# Patient Record
Sex: FEMALE | Race: OTHER | Employment: PART TIME | URBAN - METROPOLITAN AREA
[De-identification: names, ages, dates, MRNs, and addresses within clinical notes are randomized per-mention and may not be internally consistent; named-entity substitution may affect disease eponyms.]

---

## 2021-07-05 ENCOUNTER — APPOINTMENT (OUTPATIENT)
Dept: GENERAL RADIOLOGY | Age: 36
End: 2021-07-05
Payer: COMMERCIAL

## 2021-07-05 ENCOUNTER — HOSPITAL ENCOUNTER (EMERGENCY)
Age: 36
Discharge: HOME | End: 2021-07-05
Attending: EMERGENCY MEDICINE
Payer: COMMERCIAL

## 2021-07-05 VITALS
HEIGHT: 64 IN | DIASTOLIC BLOOD PRESSURE: 70 MMHG | SYSTOLIC BLOOD PRESSURE: 123 MMHG | TEMPERATURE: 98.6 F | WEIGHT: 293 LBS | RESPIRATION RATE: 21 BRPM | BODY MASS INDEX: 50.02 KG/M2 | HEART RATE: 76 BPM | OXYGEN SATURATION: 98 %

## 2021-07-05 DIAGNOSIS — I16.0 HYPERTENSIVE URGENCY: Primary | ICD-10-CM

## 2021-07-05 DIAGNOSIS — R60.9 PERIPHERAL EDEMA: ICD-10-CM

## 2021-07-05 LAB
ALBUMIN SERPL-MCNC: 4.1 G/DL (ref 3.5–4.6)
ALP BLD-CCNC: 91 U/L (ref 40–130)
ALT SERPL-CCNC: 26 U/L (ref 0–33)
ANION GAP SERPL CALCULATED.3IONS-SCNC: 9 MEQ/L (ref 9–15)
AST SERPL-CCNC: 28 U/L (ref 0–35)
BASOPHILS ABSOLUTE: 0 K/UL (ref 0–0.2)
BASOPHILS RELATIVE PERCENT: 0.4 %
BILIRUB SERPL-MCNC: 0.3 MG/DL (ref 0.2–0.7)
BUN BLDV-MCNC: 13 MG/DL (ref 6–20)
CALCIUM SERPL-MCNC: 9.2 MG/DL (ref 8.5–9.9)
CHLORIDE BLD-SCNC: 108 MEQ/L (ref 95–107)
CO2: 22 MEQ/L (ref 20–31)
CREAT SERPL-MCNC: 0.89 MG/DL (ref 0.5–0.9)
EOSINOPHILS ABSOLUTE: 0.1 K/UL (ref 0–0.7)
EOSINOPHILS RELATIVE PERCENT: 2.3 %
GFR AFRICAN AMERICAN: >60
GFR NON-AFRICAN AMERICAN: >60
GLOBULIN: 3.3 G/DL (ref 2.3–3.5)
GLUCOSE BLD-MCNC: 101 MG/DL (ref 70–99)
HCT VFR BLD CALC: 40.5 % (ref 37–47)
HEMOGLOBIN: 14 G/DL (ref 12–16)
INR BLD: 1
LYMPHOCYTES ABSOLUTE: 1.1 K/UL (ref 1–4.8)
LYMPHOCYTES RELATIVE PERCENT: 17.8 %
MCH RBC QN AUTO: 29.4 PG (ref 27–31.3)
MCHC RBC AUTO-ENTMCNC: 34.7 % (ref 33–37)
MCV RBC AUTO: 84.8 FL (ref 82–100)
MONOCYTES ABSOLUTE: 0.4 K/UL (ref 0.2–0.8)
MONOCYTES RELATIVE PERCENT: 6.4 %
NEUTROPHILS ABSOLUTE: 4.6 K/UL (ref 1.4–6.5)
NEUTROPHILS RELATIVE PERCENT: 73.1 %
PDW BLD-RTO: 13.6 % (ref 11.5–14.5)
PLATELET # BLD: 275 K/UL (ref 130–400)
POTASSIUM SERPL-SCNC: 4.1 MEQ/L (ref 3.4–4.9)
PRO-BNP: 88 PG/ML
PROTHROMBIN TIME: 13.5 SEC (ref 12.3–14.9)
RBC # BLD: 4.77 M/UL (ref 4.2–5.4)
SODIUM BLD-SCNC: 139 MEQ/L (ref 135–144)
TOTAL PROTEIN: 7.4 G/DL (ref 6.3–8)
TROPONIN: <0.01 NG/ML (ref 0–0.01)
WBC # BLD: 6.4 K/UL (ref 4.8–10.8)

## 2021-07-05 PROCEDURE — 71045 X-RAY EXAM CHEST 1 VIEW: CPT

## 2021-07-05 PROCEDURE — 84484 ASSAY OF TROPONIN QUANT: CPT

## 2021-07-05 PROCEDURE — 93005 ELECTROCARDIOGRAM TRACING: CPT | Performed by: EMERGENCY MEDICINE

## 2021-07-05 PROCEDURE — 36415 COLL VENOUS BLD VENIPUNCTURE: CPT

## 2021-07-05 PROCEDURE — 80053 COMPREHEN METABOLIC PANEL: CPT

## 2021-07-05 PROCEDURE — 85610 PROTHROMBIN TIME: CPT

## 2021-07-05 PROCEDURE — 6360000002 HC RX W HCPCS: Performed by: EMERGENCY MEDICINE

## 2021-07-05 PROCEDURE — 6370000000 HC RX 637 (ALT 250 FOR IP): Performed by: EMERGENCY MEDICINE

## 2021-07-05 PROCEDURE — 83880 ASSAY OF NATRIURETIC PEPTIDE: CPT

## 2021-07-05 PROCEDURE — 85025 COMPLETE CBC W/AUTO DIFF WBC: CPT

## 2021-07-05 PROCEDURE — 99285 EMERGENCY DEPT VISIT HI MDM: CPT

## 2021-07-05 PROCEDURE — 96374 THER/PROPH/DIAG INJ IV PUSH: CPT

## 2021-07-05 RX ORDER — CARVEDILOL 25 MG/1
25 TABLET ORAL DAILY
Qty: 30 TABLET | Refills: 0 | Status: SHIPPED | OUTPATIENT
Start: 2021-07-05

## 2021-07-05 RX ORDER — CARVEDILOL 25 MG/1
25 TABLET ORAL ONCE
Status: COMPLETED | OUTPATIENT
Start: 2021-07-05 | End: 2021-07-05

## 2021-07-05 RX ORDER — HYDROCHLOROTHIAZIDE 25 MG/1
25 TABLET ORAL DAILY
Qty: 30 TABLET | Refills: 0 | Status: SHIPPED | OUTPATIENT
Start: 2021-07-05

## 2021-07-05 RX ORDER — FUROSEMIDE 10 MG/ML
40 INJECTION INTRAMUSCULAR; INTRAVENOUS ONCE
Status: COMPLETED | OUTPATIENT
Start: 2021-07-05 | End: 2021-07-05

## 2021-07-05 RX ORDER — CEPHALEXIN 500 MG/1
500 CAPSULE ORAL 4 TIMES DAILY
Qty: 28 CAPSULE | Refills: 0 | Status: SHIPPED | OUTPATIENT
Start: 2021-07-05 | End: 2021-07-05 | Stop reason: CLARIF

## 2021-07-05 RX ADMIN — CARVEDILOL 25 MG: 25 TABLET, FILM COATED ORAL at 11:55

## 2021-07-05 RX ADMIN — FUROSEMIDE 40 MG: 10 INJECTION, SOLUTION INTRAMUSCULAR; INTRAVENOUS at 11:55

## 2021-07-05 ASSESSMENT — ENCOUNTER SYMPTOMS
ABDOMINAL PAIN: 0
NAUSEA: 0
SORE THROAT: 0
SHORTNESS OF BREATH: 0
CHEST TIGHTNESS: 0
VOMITING: 0
EYE PAIN: 0

## 2021-07-05 NOTE — ED NOTES
D/c instructions given. Aware pt has 2 prescriptions that were electronically sent to [facility] on [address]. Aware to start both meds tomorrow at noon. Verbalized understanding. No further complaints. Ambulated out with steady gait in no acute distress.      June Hedrick RN  07/05/21 1310

## 2021-07-05 NOTE — ED PROVIDER NOTES
[address] [facility] ED  EMERGENCY DEPARTMENT ENCOUNTER      Pt Name: Renetta Caba  MRN: [id number]  [place] 1985  Date of evaluation: 7/5/2021  Provider: Krupa Guzman, [facility]       Chief Complaint   Patient presents with    Foot Swelling     bilateral feel swelling x1week         HISTORY OF PRESENT ILLNESS   (Location/Symptom, Timing/Onset, Context/Setting, Quality, Duration, Modifying Factors, Severity)  Note limiting factors. Renetta Caba is a 39 y.o. female who presents to the emergency department . Patient comes in with bilateral lower leg swelling for 8 days. No chest pain or shortness of breath. Patient is supposed to be on blood pressure medication and was prescribed it 2 years ago. She did not have insurance at the time and never got it refilled. Now she has insurance and actually has an appointment with a primary care provider in 2 weeks. No recent travel. One leg is not more swollen than the other. HPI    Nursing Notes were reviewed. REVIEW OF SYSTEMS    (2-9 systems for level 4, 10 or more for level 5)     Review of Systems   Constitutional: Negative for activity change, appetite change, fatigue and fever. HENT: Negative for congestion and sore throat. Eyes: Negative for pain and visual disturbance. Respiratory: Negative for chest tightness and shortness of breath. Cardiovascular: Positive for leg swelling. Negative for chest pain and palpitations. Gastrointestinal: Negative for abdominal pain, nausea and vomiting. Endocrine: Negative for polydipsia. Genitourinary: Negative for flank pain and urgency. Musculoskeletal: Negative for gait problem and neck stiffness. Skin: Negative for rash. Neurological: Negative for weakness, light-headedness and headaches. Psychiatric/Behavioral: Negative for confusion and sleep disturbance. Except as noted above the remainder of the review of systems was reviewed and negative. PAST MEDICAL HISTORY     Past Medical History:   Diagnosis Date    Heart murmur          SURGICAL HISTORY       Past Surgical History:   Procedure Laterality Date     SECTION       SECTION  06/23/15    DR Grey Primer MOUTH SURGERY           CURRENT MEDICATIONS       Previous Medications    IBUPROFEN (ADVIL;MOTRIN) 600 MG TABLET    Take 1 tablet by mouth daily    LEVONORGESTREL (MIRENA) 20 MCG/24HR IUD    1 each by Intrauterine route once    MISOPROSTOL (CYTOTEC) 200 MCG TABLET    One tablet the night before planned procedure and one tablet the morning of planned procedure    OXYCODONE-ACETAMINOPHEN (PERCOCET) 5-325 MG PER TABLET    Take 1 tablet by mouth    PRENATAL VIT-DSS-FE FUM-FA (PRENATAL 19) TABS    Take  by mouth. ALLERGIES     Patient has no known allergies. FAMILY HISTORY       Family History   Problem Relation Age of Onset    Diabetes Mother     High Blood Pressure Mother           SOCIAL HISTORY       Social History     Socioeconomic History    Marital status:      Spouse name: None    Number of children: None    Years of education: None    Highest education level: None   Occupational History    None   Tobacco Use    Smoking status: Former Smoker    Smokeless tobacco: Never Used   Substance and Sexual Activity    Alcohol use: No    Drug use: No    Sexual activity: Yes     Partners: Male   Other Topics Concern    None   Social History Narrative    None     Social Determinants of Health     Financial Resource Strain:     Difficulty of Paying Living Expenses:    Food Insecurity:     Worried About Running Out of Food in the Last Year:     Ran Out of Food in the Last Year:    Transportation Needs:     Lack of Transportation (Medical):      Lack of Transportation (Non-Medical):    Physical Activity:     Days of Exercise per Week:     Minutes of Exercise per Session:    Stress:     Feeling of Stress :    Social Connections:     Frequency of Communication with Friends and Family:     Frequency of Social Gatherings with Friends and Family:     Attends Denominational Services:     Active Member of Clubs or Organizations:     Attends Club or Organization Meetings:     Marital Status:    Intimate Partner Violence:     Fear of Current or Ex-Partner:     Emotionally Abused:     Physically Abused:     Sexually Abused:        SCREENINGS                        PHYSICAL EXAM    (up to 7 for level 4, 8 or more for level 5)     ED Triage Vitals [07/05/21 1129]   BP Temp Temp Source Pulse Resp SpO2 Height Weight   (!) 190/100 98.6 °F (37 °C) Temporal 89 18 97 % 5' 4\" (1.626 m) (!) 304 lb (137.9 kg)       Physical Exam  Vitals and nursing note reviewed. Constitutional:       General: She is not in acute distress. Appearance: She is well-developed. She is obese. She is not diaphoretic. HENT:      Head: Normocephalic and atraumatic. Right Ear: External ear normal.      Left Ear: External ear normal.      Mouth/Throat:      Pharynx: No oropharyngeal exudate. Eyes:      Conjunctiva/sclera: Conjunctivae normal.      Pupils: Pupils are equal, round, and reactive to light. Neck:      Thyroid: No thyromegaly. Vascular: No JVD. Trachea: No tracheal deviation. Cardiovascular:      Rate and Rhythm: Normal rate. Heart sounds: Normal heart sounds. No murmur heard. Pulmonary:      Effort: Pulmonary effort is normal. No respiratory distress. Breath sounds: Normal breath sounds. No wheezing. Abdominal:      General: Bowel sounds are normal.      Palpations: Abdomen is soft. Tenderness: There is no abdominal tenderness. There is no guarding. Musculoskeletal:         General: Normal range of motion. Cervical back: Normal range of motion and neck supple. Right lower leg: Edema present. Left lower leg: Edema present. Comments: No homans   Skin:     General: Skin is warm and dry. Findings: No rash. Neurological:      Mental Status: She is alert and oriented to person, place, and time. Cranial Nerves: No cranial nerve deficit. Psychiatric:         Behavior: Behavior normal.         DIAGNOSTIC RESULTS     EKG: All EKG's are interpreted by the Emergency Department Physician who either signs or Co-signs this chart in the absence of a cardiologist.    Normal sinus rhythm 79 bpm Q waves inferiorly    RADIOLOGY:   Non-plain film images such as CT, Ultrasound and MRI are read by the radiologist. Plain radiographic images are visualized and preliminarily interpreted by the emergency physician with the below findings:    Chest x-ray shows no acute pulmonary disease    Interpretation per the Radiologist below, if available at the time of this note:    XR CHEST PORTABLE    (Results Pending)         ED BEDSIDE ULTRASOUND:   Performed by ED Physician - none    LABS:  Labs Reviewed   CBC WITH AUTO DIFFERENTIAL   COMPREHENSIVE METABOLIC PANEL   TROPONIN   BRAIN NATRIURETIC PEPTIDE   PROTIME-INR       All other labs were within normal range or not returned as of this dictation. EMERGENCY DEPARTMENT COURSE and DIFFERENTIAL DIAGNOSIS/MDM:   Vitals:    Vitals:    07/05/21 1129 07/05/21 1145 07/05/21 1155   BP: (!) 190/100 (!) 152/81 (!) 152/81   Pulse: 89 91 85   Resp: 18 15    Temp: 98.6 °F (37 °C)     TempSrc: Temporal     SpO2: 97% 99%    Weight: (!) 304 lb (137.9 kg)     Height: 5' 4\" (1.626 m)         Patient comes in with hypertensive urgency. She also has peripheral edema. Patient given IV Lasix and oral Coreg and blood pressure came down nicely. Patient will be sent home on Coreg and hydrochlorothiazide. Will follow up in 2 weeks with primary care let them reassess how her blood pressure is doing. Renal function is fine at this time. MDM      REASSESSMENT          CRITICAL CARE TIME   Total Critical Care time was 0 minutes, excluding separately reportable procedures.   There was a high probability of clinically significant/life threatening deterioration in the patient's condition which required my urgent intervention. CONSULTS:  None    PROCEDURES:  Unless otherwise noted below, none     Procedures        FINAL IMPRESSION      1. Hypertensive urgency    2. Peripheral edema          DISPOSITION/PLAN   DISPOSITION        PATIENT REFERRED TO:  Ahsan Mcghee MD  [address] [place], [place] [address] [phone]      As needed      DISCHARGE MEDICATIONS:  New Prescriptions    CARVEDILOL (COREG) 25 MG TABLET    Take 1 tablet by mouth daily    CEPHALEXIN (KEFLEX) 500 MG CAPSULE    Take 1 capsule by mouth 4 times daily for 7 days    HYDROCHLOROTHIAZIDE (HYDRODIURIL) 25 MG TABLET    Take 1 tablet by mouth daily     Controlled Substances Monitoring:     No flowsheet data found.     (Please note that portions of this note were completed with a voice recognition program.  Efforts were made to edit the dictations but occasionally words are mis-transcribed.)    Nataliia Garcia DO (electronically signed)  Attending Emergency Physician           Nataliia Garcia DO  07/05/21 6331

## 2021-07-05 NOTE — ED TRIAGE NOTES
Bilateral feel swelling x1week. Pt states that if she walks too much she has pain in right foot and states it feels like it is pulling.

## 2021-07-07 LAB
EKG ATRIAL RATE: 234 BPM
EKG P AXIS: 26 DEGREES
EKG P-R INTERVAL: 122 MS
EKG Q-T INTERVAL: 366 MS
EKG QRS DURATION: 96 MS
EKG QTC CALCULATION (BAZETT): 419 MS
EKG R AXIS: 4 DEGREES
EKG T AXIS: 9 DEGREES
EKG VENTRICULAR RATE: 79 BPM

## 2021-07-07 PROCEDURE — 93010 ELECTROCARDIOGRAM REPORT: CPT | Performed by: INTERNAL MEDICINE

## 2022-03-24 ENCOUNTER — INITIAL CONSULT (OUTPATIENT)
Dept: PAIN MANAGEMENT | Age: 37
End: 2022-03-24
Payer: COMMERCIAL

## 2022-03-24 VITALS
DIASTOLIC BLOOD PRESSURE: 80 MMHG | BODY MASS INDEX: 50.02 KG/M2 | TEMPERATURE: 97.3 F | WEIGHT: 293 LBS | SYSTOLIC BLOOD PRESSURE: 132 MMHG | HEIGHT: 64 IN

## 2022-03-24 DIAGNOSIS — M47.817 LUMBOSACRAL SPONDYLOSIS WITHOUT MYELOPATHY: ICD-10-CM

## 2022-03-24 DIAGNOSIS — M47.817 LUMBOSACRAL SPONDYLOSIS WITHOUT MYELOPATHY: Primary | ICD-10-CM

## 2022-03-24 PROCEDURE — 99204 OFFICE O/P NEW MOD 45 MIN: CPT | Performed by: PAIN MEDICINE

## 2022-03-24 ASSESSMENT — ENCOUNTER SYMPTOMS
CONSTIPATION: 0
BACK PAIN: 0
DIARRHEA: 0
SHORTNESS OF BREATH: 0
NAUSEA: 0

## 2022-03-24 NOTE — PROGRESS NOTES
[facility] ([facility]) Physicians  Neurosurgery and Pain [facility]  [address] [facility], [address] [place] [address]., [address] [address]: [phone]  F: [phone]        Toshia Cage  (1985)    3/24/2022    Subjective:     Toshia Cage is 40 y.o. female who complains today of:     Chief Complaint   Patient presents with    Back Pain     Patient here today for initial evaluation. Has had chronic pain for over 10 years. Will try Motrin Tylenol at times to help with little bit of intermittent pain. No back surgery, she is not diabetic, not on blood thinners. She works at [employer]. Sitting standing laying bother. She currently does not smoke sometimes she was a social smoker. Pain is mostly in the middle of her mid lumbar spine. No recent physical therapy. Plan: We discussed options. Her diet and weight loss. We will get x-rays lumbar spine with flexion-extension views to evaluate for instability. We will get her into physical therapy for lumbar spine for core strengthening stretching home exercise program.  We will see her in 4 to 6 weeks for follow-up. Questions answered chart was reviewed. He understands plan is in agreement    Allergies:  Patient has no known allergies. Past Medical History:   Diagnosis Date    Heart murmur      Past Surgical History:   Procedure Laterality Date     SECTION       SECTION  06/23/15    DR Billingsley  MOUTH SURGERY       Family History   Problem Relation Age of Onset    Diabetes Mother     High Blood Pressure Mother      Social History     Socioeconomic History    Marital status:      Spouse name: Not on file    Number of children: Not on file    Years of education: Not on file    Highest education level: Not on file   Occupational History    Not on file   Tobacco Use    Smoking status: Former Smoker    Smokeless tobacco: Never Used   Substance and Sexual Activity    Alcohol use: No    Drug use:  No daily  2    oxyCODONE-acetaminophen (PERCOCET) 5-325 MG per tablet Take 1 tablet by mouth  0    Prenatal Vit-DSS-Fe Fum-FA (PRENATAL 19) TABS Take  by mouth. No current facility-administered medications on file prior to visit. HPI    Review of Systems   Constitutional: Negative for fever. HENT: Negative for hearing loss. Respiratory: Negative for shortness of breath. Gastrointestinal: Negative for constipation, diarrhea and nausea. Genitourinary: Negative for difficulty urinating. Musculoskeletal: Negative for back pain and neck pain. Skin: Negative for rash. Neurological: Negative for headaches. Hematological: Does not bruise/bleed easily. Psychiatric/Behavioral: Negative for sleep disturbance. Objective:     Vitals:  /80   Temp 97.3 °F (36.3 °C)   Ht 5' 4\" (1.626 m)   Wt (!) 305 lb (138.3 kg)   BMI 52.35 kg/m² Pain Score:   2      Physical Exam  Patient is AO X 3 , recent and remote memory is intact,mood and affect normal,judgement and insight normal. Head normacephalic,eyes - PERRLA, EOMI, No obvious external Ears, Nose, mouth masses seen, neck supple, trachae midline, no abnormal lymph nodes palpated in neck or supraclavicular region. CN's 2-12 grossly intact. Strength functional for ambulation, balance functional, coordination normal. Visualized skin intact, no obvious lesion or visualized cyanosis. No swelling. Pulses intact. No obvious upper motor neuron signs. Sensation grossly intact to light touch. Strength functional upper extremities. Decreased range of motion lumbar spine    Assessment:      Diagnosis Orders   1.  Lumbosacral spondylosis without myelopathy  XR LUMBAR SPINE (MIN 4 VIEWS)    Ambulatory referral to Physical Therapy       Plan:

## 2022-04-14 ENCOUNTER — HOSPITAL ENCOUNTER (OUTPATIENT)
Dept: PHYSICAL THERAPY | Age: 37
Setting detail: THERAPIES SERIES
Discharge: HOME | End: 2022-04-14
Payer: COMMERCIAL

## 2022-04-14 PROCEDURE — 97161 PT EVAL LOW COMPLEX 20 MIN: CPT

## 2022-04-14 PROCEDURE — 97110 THERAPEUTIC EXERCISES: CPT

## 2022-04-14 ASSESSMENT — PAIN - FUNCTIONAL ASSESSMENT: PAIN_FUNCTIONAL_ASSESSMENT: PREVENTS OR INTERFERES WITH ALL ACTIVE AND SOME PASSIVE ACTIVITIES

## 2022-04-14 ASSESSMENT — PAIN DESCRIPTION - FREQUENCY: FREQUENCY: INTERMITTENT

## 2022-04-14 ASSESSMENT — PAIN DESCRIPTION - PROGRESSION: CLINICAL_PROGRESSION: GRADUALLY WORSENING

## 2022-04-14 ASSESSMENT — PAIN SCALES - GENERAL: PAINLEVEL_OUTOF10: 4

## 2022-04-14 ASSESSMENT — PAIN DESCRIPTION - DESCRIPTORS: DESCRIPTORS: SHARP;ACHING

## 2022-04-14 ASSESSMENT — PAIN DESCRIPTION - PAIN TYPE: TYPE: CHRONIC PAIN

## 2022-04-14 ASSESSMENT — PAIN DESCRIPTION - ONSET: ONSET: ON-GOING

## 2022-04-14 ASSESSMENT — PAIN DESCRIPTION - LOCATION: LOCATION: BACK

## 2022-04-14 ASSESSMENT — PAIN DESCRIPTION - ORIENTATION: ORIENTATION: LEFT;RIGHT;LOWER

## 2022-04-14 NOTE — PROGRESS NOTES
extension = 3+/5; hip abduction = 3+/5;hip adduction = 3+/5; knee extension = 5/5; knee flexion = 4+/5     Strength Other  Other: core = 3+/5; lumbar extension = 3+/5 [] yes  [x] no   Long term goal 3: The patient will report decreased low back pain </=2/10 consistently in order to improve ability to perform functional mobility tasks   Pain Location: Back    Pain Level: 4 (0-10 pain ranges)    Pain Descriptors: Sharp,Aching [] yes  [x] no   Long term goal 4: The patient will demonstrate competency with HEP to progress towards self management of symptoms upon D/C On-going, initiated this date [] yes  [x] no   Long term goal 5: The patient will have a decrease in Oswestry score >/=10 points in order to demonstrate increased functional activity tolerance Exam: medium; Oswestry = 12/50 [] yes  [x] no      Body structures, Functions, Activity limitations: Decreased functional mobility ,Decreased ADL status,Decreased ROM,Decreased strength,Decreased endurance,Decreased posture,Increased pain  Assessment: Patient is a 40year old female who presents with chronic low back pain that began years ago & recently flared-up ~1 week ago. Patient demonstrates increased low back pain, decreased ability to perform functional mobility tasks & ADLs, impaired functional endurance, decreased postural awareness, decreased pain free lumbar ROM, & decreased bilateral LE & core & lumbar strength. Patient would benefit from outpatient PT services in order to address these impairments as well as improve patient's QOL & ease with ADLs.   Prognosis: Good  Discharge Recommendations: Continue to assess pending progress    PT Education: Goals;PT Role;Plan of Care;Home Exercise Program    PLAN: [x] Evaluate and Treat  Frequency/Duration:  Plan  Times per week: 2xs  Plan weeks: 4-6 weeks  Current Treatment Recommendations: Eligio Bright Training,Stair training,Gait Imtiaz Jama Therapy - Joint Manipulation,Safety Education & Training,Manual Therapy - Soft Tissue Mobilization,Neuromuscular Re-education,Home Exercise Program,Patient/Caregiver Education & Training     Precautions:       arthritis, chronic pain, high blood pressure                     Patient Status:[x] Continue/ Initiate plan of Care    [] Discharge PT. Recommend pt continue with HEP. [] Additional visits requested, Please re-certify for additional visits:          Signature: Electronically signed by Loyda Rogers PT on 4/14/22 at 5:30 PM EDT      If you have any questions or concerns, please don't hesitate to call. Thank you for your referral.    I have reviewed this plan of care and certify a need for medically necessary rehabilitation services.     Physician Signature:__________________________________________________________  Date:  Please sign and return

## 2022-04-18 ENCOUNTER — HOSPITAL ENCOUNTER (OUTPATIENT)
Dept: PHYSICAL THERAPY | Age: 37
Setting detail: THERAPIES SERIES
Discharge: HOME | End: 2022-04-18
Payer: COMMERCIAL

## 2022-04-18 PROCEDURE — 97110 THERAPEUTIC EXERCISES: CPT

## 2022-04-18 ASSESSMENT — PAIN DESCRIPTION - PROGRESSION: CLINICAL_PROGRESSION: GRADUALLY WORSENING

## 2022-04-18 NOTE — PROGRESS NOTES
[address] [address]  Outpatient Physical Therapy    Treatment Note        Date: 2022  Patient: Luis Alfredo Lopez  : 1985  ACCT #: [de-identified]  Referring Practitioner: Sandhya Pozo DO  Diagnosis: Lumbosacral spondylosis without myelopathy  Treatment Diagnosis: increased low back pain, decreased ability to perform functional mobility tasks & ADLs, impaired functional endurance, decreased postural awareness, decreased pain free lumbar ROM, & decreased bilateral LE & core & lumbar strength    Visit Information:  PT Visit Information  PT Insurance Information: [employer]  Total # of Visits Approved: 25  Total # of Visits to Date: 2  No Show: 0  Canceled Appointment: 0  Progress Note Counter: -    Subjective: Pt reports no pain in last 3 days. Comments: PLOF = 50% & CLOF = 50%; XRAY LUMBAR : Impression:1. Distal lumbar, lumbosacral arthritis, possible stenosis; 2. Normal alignment, no instability; RTD = 22  HEP Compliance:  [x] Good [] Fair [] Poor [] Reports not doing due to:    Vital Signs  Patient Currently in Pain: Denies   Pain Screening  Patient Currently in Pain: Denies  Pain Assessment  Clinical Progression: Gradually worsening    OBJECTIVE:   Exercises  Exercise 1: TA isometrics supine, 1 set x 5 reps x 15 second hold  Exercise 2: supine bridges, 1 set x 15 reps x 5 second hold  Exercise 3: LTR, 1 set x 10 reps with  second hold each rep  Exercise 4: open book 5 sec x 10  Exercise 5: SLR with TA x 10  Exercise 6: S/L Abd B x 10  Exercise 8: SF L 1.0 x 5 minutes  Exercise 9: DLS 3 ways x 10  Exercise 20: HEP: continue current SLR, S/L Abd, DLS 3 ways     Strength: [x] NT  [] MMT completed:     ROM: [x] NT  [] ROM measurements:        Modalities:  Modalities  Other: Declined HP     *Indicates exercise, modality, or manual techniques to be initiated when appropriate    Assessment:         Body structures, Functions, Activity limitations: Decreased functional mobility ,Decreased ADL status,Decreased ROM,Decreased strength,Decreased endurance,Decreased posture,Increased pain  Assessment: Continue to progress current exercises with focus on core strengthening. Added DLS 3 ways, Book opening, SLR and S/L Abd without complaint of LBP. Pt did state DLS UE irritated right shoudler. Instructed to do in comfortable range. Discussed Body mechanics Sit to sup. and sleeping. Given handout on body mechanics. declined HP post session. Treatment Diagnosis: increased low back pain, decreased ability to perform functional mobility tasks & ADLs, impaired functional endurance, decreased postural awareness, decreased pain free lumbar ROM, & decreased bilateral LE & core & lumbar strength          Goals:  Short term goals  Time Frame for Short term goals: 2-4 weeks  Short term goal 1: The patient will demonstrate improved postural awareness requiring <25% verbal cueing during functional mobility tasks & exercises    Long term goals  Time Frame for Long term goals : 4-6 weeks  Long term goal 1: The patient will improve pain free lumbar AROM >/= 25-50* in order to increase ability to perform functional mobility tasks efficiently. Long term goal 2: The patient will demonstrate improved B LE & core & lumbar strength >/=4+/5 in order to perform functional mobility tasks with increased ease. Long term goal 3: The patient will report decreased low back pain </=2/10 consistently in order to improve ability to perform functional mobility tasks  Long term goal 4: The patient will demonstrate competency with HEP to progress towards self management of symptoms upon D/C  Long term goal 5: The patient will have a decrease in Oswestry score >/=10 points in order to demonstrate increased functional activity tolerance  Progress toward goals: Strength    POST-PAIN       Pain Rating (0-10 pain scale):  0 /10   Location and pain description same as pre-treatment unless indicated.    Action: [] NA   [x] Perform HEP  [] Meds as prescribed  [] Modalities as prescribed   [] Call Physician     Frequency/Duration:  Plan  Times per week: 2xs  Plan weeks: 4-6 weeks  Current Treatment Recommendations: Catana Morita Training,Stair training,Gait [place] Education & Training,Manual Therapy - Soft Tissue Mobilization,Neuromuscular Re-education,Home Exercise Program,Patient/Caregiver Education & Training     Pt to continue current HEP. See objective section for any therapeutic exercise changes, additions or modifications this date.          PT Individual Minutes  Time In: 9614  Time Out: 3368  Minutes: 39  Timed Code Treatment Minutes: 39 Minutes  Procedure Minutes: 0     Timed Activity Minutes Units   Ther Ex 39 3     Signature:  Electronically signed by Brad Poole PTA on 4/18/22 at 4:25 PM EDT

## 2022-04-21 ENCOUNTER — HOSPITAL ENCOUNTER (OUTPATIENT)
Dept: PHYSICAL THERAPY | Age: 37
Setting detail: THERAPIES SERIES
Discharge: HOME | End: 2022-04-21
Payer: COMMERCIAL

## 2022-04-21 PROCEDURE — 97110 THERAPEUTIC EXERCISES: CPT

## 2022-04-21 ASSESSMENT — PAIN SCALES - GENERAL: PAINLEVEL_OUTOF10: 1

## 2022-04-21 ASSESSMENT — PAIN DESCRIPTION - PAIN TYPE: TYPE: CHRONIC PAIN

## 2022-04-21 ASSESSMENT — PAIN DESCRIPTION - LOCATION: LOCATION: BACK

## 2022-04-21 ASSESSMENT — PAIN DESCRIPTION - ORIENTATION: ORIENTATION: LOWER

## 2022-04-21 ASSESSMENT — PAIN DESCRIPTION - DESCRIPTORS: DESCRIPTORS: ACHING

## 2022-04-21 NOTE — PROGRESS NOTES
[facility]  Outpatient Physical Therapy    Treatment Note        Date: 2022  Patient: Lashonda Gilliland  : 1985   Confirmed: Yes  MRN: [id number]  Referring Provider: Claudette Ellis, DO   Secondary Referring Provider:             Visit Information:  PT Visit Information  Total # of Visits Approved: 25  Total # of Visits to Date: 3  No Show: 0  Canceled Appointment: 0  Progress Note Counter: 3/8-12    Subjective Information:  Subjective: pain today is 1/10  HEP Compliance:  [x] Good [] Fair [] Poor [] Reports not doing due to:    Pain Screening  Patient Currently in Pain: Yes  Pain Level: 1  Pain Type: Chronic pain  Pain Location: Back  Pain Orientation: Lower  Pain Descriptors: Aching    Treatment:  Exercises:  Exercises  Exercise 3: LTR's 10 sec x 10  Exercise 4: open book 5 sec x 10  Exercise 7: thoracic stretch 3-way 10 sec x 10  Exercise 8: SF L-2.0 x 5 minutes  Exercise 9: DLS 3 ways x 10, supine  Exercise 10: reverse crunches x 10, w/ ball between knees  Exercise 11: ab walkouts x 15  Exercise 12: LX ext touching shoulder blades to wall x 15  Exercise 13: trunk rotation stretch 3 sec x 15, seated at corner of mat    *Indicates exercise, modality, or manual techniques to be initiated when appropriate  Strength: [x] NT  [] MMT completed:        ROM: [] NT  [x] ROM measurements:    LX flexion >75%          Assessment:    Body Structures, Functions, Activity Limitations Requiring Skilled Therapeutic Intervention: Decreased functional mobility ,Decreased ADL status,Decreased ROM,Decreased strength,Decreased endurance,Decreased posture,Increased pain  Assessment: continued w/ current exercises, increased Sci-Fit, added, reverse crunches, ab walkouts, thoracic stretch 3-way over p-ball, LX ext touching shoulder blades to wall and trunk rotation stretch seated at corner of mat, good tolerance to all      Post-Pain Assessment:       Pain Rating (0-10 pain scale):   0/10   Location and pain description same as pre-treatment unless indicated. Action: [] NA   [x] Perform HEP  [] Meds as prescribed  [] Modalities as prescribed   [] Call Physician     GOALS   Patient Goal(s): Patient goals : \"less pain\"  Short Term Goals Completed by 2-4 weeks Goal Status   The patient will demonstrate improved postural awareness requiring <25% verbal cueing during functional mobility tasks & exercises In progress                               Long Term Goals Completed by 4-6 weeks Goal Status   The patient will improve pain free lumbar AROM >/= 25-50* in order to increase ability to perform functional mobility tasks efficiently. In progress   The patient will demonstrate improved B LE & core & lumbar strength >/=4+/5 in order to perform functional mobility tasks with increased ease. In progress   The patient will report decreased low back pain </=2/10 consistently in order to improve ability to perform functional mobility tasks In progress   The patient will demonstrate competency with HEP to progress towards self management of symptoms upon D/C Met   The patient will have a decrease in Oswestry score >/=10 points in order to demonstrate increased functional activity tolerance In progress                 Plan:  Frequency/Duration:  Plan  Plan weeks: 4-6 weeks  Current Treatment Recommendations: Strengthening,ROM,Balance training,Stair training,Gait training,Positioning,Pain management,Manual Therapy - Joint Manipulation,Manual Therapy - Soft Tissue Mobilization,Safety education & training,Neuromuscular re-education,Home exercise program,Patient/Caregiver education & training  Pt to continue current HEP. See objective section for any therapeutic exercise changes, additions or modifications this date.     Therapy Time:      PT Individual Minutes  Time In: 0295  Time Out: 1157  Minutes: 38  Timed Code Treatment Minutes: 38 Minutes  Procedure Minutes:0    Timed Activity Minutes Units   Ther Ex 38 3     Electronically signed by:   Claribel Vivas, PTA  Date: 4/21/2022

## 2022-04-26 ENCOUNTER — HOSPITAL ENCOUNTER (OUTPATIENT)
Dept: PHYSICAL THERAPY | Age: 37
Setting detail: THERAPIES SERIES
Discharge: HOME | End: 2022-04-26
Payer: COMMERCIAL

## 2022-04-26 PROCEDURE — 97110 THERAPEUTIC EXERCISES: CPT

## 2022-04-26 ASSESSMENT — PAIN DESCRIPTION - FREQUENCY: FREQUENCY: CONTINUOUS

## 2022-04-26 ASSESSMENT — PAIN DESCRIPTION - DESCRIPTORS: DESCRIPTORS: ACHING

## 2022-04-26 ASSESSMENT — PAIN DESCRIPTION - ORIENTATION: ORIENTATION: LOWER

## 2022-04-26 ASSESSMENT — PAIN DESCRIPTION - PAIN TYPE: TYPE: CHRONIC PAIN

## 2022-04-26 ASSESSMENT — PAIN SCALES - GENERAL: PAINLEVEL_OUTOF10: 2

## 2022-04-26 ASSESSMENT — PAIN DESCRIPTION - LOCATION: LOCATION: BACK

## 2022-04-26 NOTE — PROGRESS NOTES
[facility]  Outpatient Physical Therapy    Treatment Note        Date: 2022  Patient: Petar Rausch  : 1985   Confirmed: Yes  MRN: [id number]  Referring Provider: Mary Silverman DO   Secondary Referring Provider (If applicable):     Medical Diagnosis: Spondylosis without myelopathy or radiculopathy, lumbosacral region [M47.817]         Visit Information:  Insurance: Payor: Freddie Ojeda / Plan: Freddie Ojeda / Product Type: *No Product type* /   PT Visit Information  Total # of Visits Approved: 25  Total # of Visits to Date: 3  No Show: 0  Canceled Appointment: 0  Progress Note Counter: 3/8-12    Subjective Information:  Subjective: Patient c/o general muscle fatigue/soreness due to lifting heavy boxes at work this morning. HEP Compliance:  [x] Good [] Fair [] Poor [] Reports not doing due to:    Pain Screening  Patient Currently in Pain: Yes  Pain Level: 2  Pain Type: Chronic pain  Pain Location: Back  Pain Orientation: Lower  Pain Descriptors: Aching  Pain Frequency: Continuous    Treatment:  Exercises:  Exercises  Exercise 3: LTR's 10 sec x 10  Exercise 4: open book 5 sec x 10  Exercise 5: SLR with TA x 10  Exercise 7: thoracic stretch 3-way 10 sec x 10  Exercise 8: SF L-2.0 x 5 minutes  Exercise 9: DLS 3 ways x 10, supine  Exercise 10: reverse crunches x 15, w/ ball between knees  Exercise 11: ab walkouts x 15  Exercise 12: LX ext touching shoulder blades to wall 3\"x10  Exercise 13: trunk rotation stretch 20\"x3  Assessment: Body Structures, Functions, Activity Limitations Requiring Skilled Therapeutic Intervention: Decreased functional mobility ,Decreased ADL status,Decreased ROM,Decreased strength,Decreased endurance,Decreased posture,Increased pain  Assessment: continued current POC with focus on core stabilization and LE strength. Provided verbal cues and demonstration to improve TA isos/core engagement.good carryover observed. Patient tolerates exercises without reports of increased pain. Dicussed use of heat/ice to mange symptoms at home. Post-Pain Assessment:       Pain Rating (0-10 pain scale):   1-2/10   Location and pain description same as pre-treatment unless indicated. Action: [x] NA   [] Perform HEP  [] Meds as prescribed  [] Modalities as prescribed   [] Call Physician     GOALS   Patient Goal(s): Patient goals : \"less pain\"    Short Term Goals Completed by 2-4 weeks Goal Status   STG 1 The patient will demonstrate improved postural awareness requiring <25% verbal cueing during functional mobility tasks & exercises In progress       Long Term Goals Completed by 4-6 weeks Goal Status   LTG 1 The patient will improve pain free lumbar AROM >/= 25-50* in order to increase ability to perform functional mobility tasks efficiently. In progress   LTG 2 The patient will demonstrate improved B LE & core & lumbar strength >/=4+/5 in order to perform functional mobility tasks with increased ease. In progress   LTG 3 The patient will report decreased low back pain </=2/10 consistently in order to improve ability to perform functional mobility tasks In progress   LTG 4 The patient will demonstrate competency with HEP to progress towards self management of symptoms upon D/C In progress   LTG 5 The patient will have a decrease in Oswestry score >/=10 points in order to demonstrate increased functional activity tolerance In progress     Plan:  Frequency/Duration:  Plan  Plan weeks: 4-6 weeks  Current Treatment Recommendations: Strengthening,ROM,Balance training,Stair training,Gait training,Positioning,Pain management,Manual Therapy - Joint Manipulation,Manual Therapy - Soft Tissue Mobilization,Safety education & training,Neuromuscular re-education,Home exercise program,Patient/Caregiver education & training  Pt to continue current HEP. See objective section for any therapeutic exercise changes, additions or modifications this date.     Therapy Time:      PT Individual Minutes  Time In: 0622  Time Out: 1420  Minutes: 40  Timed Code Treatment Minutes: 40 Minutes  Procedure Minutes:  Timed Activity Minutes Units   Ther Ex 40 3     Electronically signed by:   Nahomi Fang PTA  Date: 4/26/2022

## 2022-04-28 ENCOUNTER — HOSPITAL ENCOUNTER (OUTPATIENT)
Dept: PHYSICAL THERAPY | Age: 37
Setting detail: THERAPIES SERIES
Discharge: HOME | End: 2022-04-28
Payer: COMMERCIAL

## 2022-04-28 ENCOUNTER — OFFICE VISIT (OUTPATIENT)
Dept: PAIN MANAGEMENT | Age: 37
End: 2022-04-28
Payer: COMMERCIAL

## 2022-04-28 VITALS
BODY MASS INDEX: 50.02 KG/M2 | HEIGHT: 64 IN | TEMPERATURE: 97.3 F | DIASTOLIC BLOOD PRESSURE: 86 MMHG | WEIGHT: 293 LBS | SYSTOLIC BLOOD PRESSURE: 138 MMHG

## 2022-04-28 DIAGNOSIS — M47.817 LUMBOSACRAL SPONDYLOSIS WITHOUT MYELOPATHY: Primary | ICD-10-CM

## 2022-04-28 PROBLEM — M23.91 DERANGEMENT OF RIGHT KNEE: Status: ACTIVE | Noted: 2022-01-27

## 2022-04-28 PROBLEM — G56.03 CARPAL TUNNEL SYNDROME, BILATERAL: Status: ACTIVE | Noted: 2017-09-15

## 2022-04-28 PROBLEM — N18.31 STAGE 3A CHRONIC KIDNEY DISEASE (HCC): Status: ACTIVE | Noted: 2021-09-29

## 2022-04-28 PROCEDURE — 97110 THERAPEUTIC EXERCISES: CPT

## 2022-04-28 PROCEDURE — 99213 OFFICE O/P EST LOW 20 MIN: CPT | Performed by: NURSE PRACTITIONER

## 2022-04-28 ASSESSMENT — ENCOUNTER SYMPTOMS
COUGH: 0
DIARRHEA: 0
BACK PAIN: 1
TROUBLE SWALLOWING: 0
SHORTNESS OF BREATH: 0
EYES NEGATIVE: 1
CONSTIPATION: 0
GASTROINTESTINAL NEGATIVE: 1

## 2022-04-28 NOTE — PROGRESS NOTES
[facility]  Outpatient Physical Therapy    Treatment Note        Date: 2022  Patient: Mary Curran  : 1985   Confirmed: Yes  MRN: [id number]  Referring Provider: Golden Menendez DO   Secondary Referring Provider (If applicable):     Medical Diagnosis: Spondylosis without myelopathy or radiculopathy, lumbosacral region [M47.817]    Treatment Diagnosis: increased low back pain, decreased ability to perform functional mobility tasks & ADLs, impaired functional endurance, decreased postural awareness, decreased pain free lumbar ROM, & decreased bilateral LE & core & lumbar strength    Visit Information:  Insurance: Payor: Dietra Laughter / Plan: Dietra Laughter / Product Type: *No Product type* /   PT Visit Information  Total # of Visits Approved: 26  Total # of Visits to Date: 5  No Show: 0  Canceled Appointment: 0  Progress Note Counter: -    Subjective Information:  Subjective: Pt states  \"Definitely a lot better than Tuesday. \" No pain currently. Pt reports having the day off from work today. HEP Compliance:  [x] Good [] Fair [] Poor [] Reports not doing due to:    Pain Screening  Patient Currently in Pain: No    Treatment:  Exercises:  Exercises  Exercise 1: Seated QL stretch 10\"x5 b/l  Exercise 2: supine bridges, 1 set x 15 reps x 5 second hold, LE's elevated on Pball 5\"x15  Exercise 4: open book 5 sec x 10  Exercise 5: SLR with TA x 10  Exercise 6: S/L Abd B x 10  Exercise 7: thoracic stretch 3-way 10 sec x 10  Exercise 8: SF L-2.0 x 5 minutes  Exercise 10: reverse crunches w/ Pball  5\"x 15, w/ ball between knees  Exercise 12: Pallof presses and resisted trunk rot x10 ea double YTB, b/l  Exercise 20: HEP: seated OR standing SB stretch    Modalities:    Pt declined, no pain post tx   *Indicates exercise, modality, or manual techniques to be initiated when appropriate    Objective Measures:      Strength: [x] NT  [] MMT completed:     ROM: [x] NT  [] ROM measurements:      Assessment:    Body Structures, Functions, Activity Limitations Requiring Skilled Therapeutic Intervention: Decreased functional mobility ,Decreased ADL status,Decreased ROM,Decreased strength,Decreased endurance,Decreased posture,Increased pain  Assessment: Addition of pallof presses and resisted trunk rot w/ focus on abd bracing during functional tasks as pt noted most LB strain w/ standing activity at home/work (dishes, cooking etc). Good ability to isolate core today w/ dynamic mvmt w/o complaints of pain following session. Will cont to build on LE, core and back strength in preparation for increasing work hours this summer. Treatment Diagnosis: increased low back pain, decreased ability to perform functional mobility tasks & ADLs, impaired functional endurance, decreased postural awareness, decreased pain free lumbar ROM, & decreased bilateral LE & core & lumbar strength  Therapy Prognosis: Good    Post-Pain Assessment:       Pain Rating (0-10 pain scale):   0/10   Location and pain description same as pre-treatment unless indicated. Action: [x] NA   [] Perform HEP  [] Meds as prescribed  [] Modalities as prescribed   [] Call Physician     GOALS   Patient Goal(s): Patient goals : \"less pain\"    Short Term Goals Completed by 2-4 weeks Goal Status   STG 1 The patient will demonstrate improved postural awareness requiring <25% verbal cueing during functional mobility tasks & exercises In progress     Long Term Goals Completed by 4-6 weeks Goal Status   LTG 1 The patient will improve pain free lumbar AROM >/= 25-50* in order to increase ability to perform functional mobility tasks efficiently. In progress   LTG 2 The patient will demonstrate improved B LE & core & lumbar strength >/=4+/5 in order to perform functional mobility tasks with increased ease.  In progress   LTG 3 The patient will report decreased low back pain </=2/10 consistently in order to improve ability to perform functional mobility tasks In progress   LTG 4 The patient will demonstrate competency with HEP to progress towards self management of symptoms upon D/C In progress   LTG 5 The patient will have a decrease in Oswestry score >/=10 points in order to demonstrate increased functional activity tolerance In progress     Plan:  Frequency/Duration:  Plan  Plan weeks: 4-6 weeks  Current Treatment Recommendations: Strengthening,ROM,Balance training,Stair training,Gait training,Positioning,Pain management,Manual Therapy - Joint Manipulation,Manual Therapy - Soft Tissue Mobilization,Safety education & training,Neuromuscular re-education,Home exercise program,Patient/Caregiver education & training  Pt to continue current HEP. See objective section for any therapeutic exercise changes, additions or modifications this date.     Therapy Time:      PT Individual Minutes  Time In: 4447  Time Out: 1423  Minutes: 40  Timed Code Treatment Minutes: 40 Minutes  Procedure Minutes: N/A   Timed Activity Minutes Units   Ther Ex 40 3     Electronically signed by:   Yassine Agosto PTA  Date: 4/28/2022

## 2022-04-28 NOTE — PROGRESS NOTES
Milli Coreas  (1985)    2022    Subjective:     Milli Coreas is 40 y.o. female who complains today of:    Chief Complaint   Patient presents with    Back Pain         Allergies:  Patient has no known allergies. Past Medical History:   Diagnosis Date    Carpal tunnel syndrome, bilateral 9/15/2017    Essential hypertension 3/16/2016    Heart murmur     Low back pain 3/16/2016    Morbid obesity ([facility] [address] 3/16/2016    Stage 3a chronic kidney disease ([facility] [address] 2021     Past Surgical History:   Procedure Laterality Date     SECTION  2006     SECTION  06/23/15    DR Que English MOUTH SURGERY       Family History   Problem Relation Age of Onset    Diabetes Mother     High Blood Pressure Mother      Social History     Socioeconomic History    Marital status:      Spouse name: Not on file    Number of children: Not on file    Years of education: Not on file    Highest education level: Not on file   Occupational History    Not on file   Tobacco Use    Smoking status: Former Smoker    Smokeless tobacco: Never Used   Substance and Sexual Activity    Alcohol use: No    Drug use: No    Sexual activity: Yes     Partners: Male   Other Topics Concern    Not on file   Social History Narrative    Not on file     Social Determinants of Health     Financial Resource Strain:     Difficulty of Paying Living Expenses: Not on file   Food Insecurity:     Worried About Running Out of Food in the Last Year: Not on file    Lori of Food in the Last Year: Not on file   Transportation Needs:     Lack of Transportation (Medical): Not on file    Lack of Transportation (Non-Medical):  Not on file   Physical Activity:     Days of Exercise per Week: Not on file    Minutes of Exercise per Session: Not on file   Stress:     Feeling of Stress : Not on file   Social Connections:     Frequency of Communication with Friends and Family: Not on file    Frequency of Social Gatherings with Friends and Family: Not on file    Attends [employer] Services: Not on file    Active Member of Clubs or Organizations: Not on file    Attends Club or Organization Meetings: Not on file    Marital Status: Not on file   Intimate Partner Violence:     Fear of Current or Ex-Partner: Not on file    Emotionally Abused: Not on file    Physically Abused: Not on file    Sexually Abused: Not on file   Housing Stability:     Unable to Pay for Housing in the Last Year: Not on file    Number of [place] in the Last Year: Not on file    Unstable Housing in the Last Year: Not on file       Current Outpatient Medications on File Prior to Visit   Medication Sig Dispense Refill    carvedilol (COREG) 25 MG tablet Take 1 tablet by mouth daily 30 tablet 0    hydroCHLOROthiazide (HYDRODIURIL) 25 MG tablet Take 1 tablet by mouth daily 30 tablet 0    levonorgestrel (MIRENA) 20 MCG/24HR IUD 1 each by Intrauterine route once      misoprostol (CYTOTEC) 200 MCG tablet One tablet the night before planned procedure and one tablet the morning of planned procedure 2 tablet 0    ibuprofen (ADVIL;MOTRIN) 600 MG tablet Take 1 tablet by mouth daily  2    oxyCODONE-acetaminophen (PERCOCET) 5-325 MG per tablet Take 1 tablet by mouth  0    Prenatal Vit-DSS-Fe Fum-FA (PRENATAL 19) TABS Take  by mouth. No current facility-administered medications on file prior to visit. Pt presents today for a f/u of her pain. PCP is Dr. Sharita Koehler MD.  Patient saw Dr. Hernandez South on 3/24/2022 for her initial consult for her pain. Denies back surgery or diabetes. Denies blood thinners. She works at [employer]. She says she is a \"social smoker\". He discussed diet and weight loss. X-rays ordered with flexion-extension views and encouraged her to start physical therapy. It appears she has been doing therapy and feels this has been doing [employer]" and feels her back pain is better. Says \"random sharp pain\" on Rt better. X-ray report of the low back dated 3/24/2022 shows distal lumbar sacral arthritis moderate to severe disc space loss at L4-5 no instability. Patient has a history of bilateral carpal tunnel syndrome, drainage of the right knee/OA, hypertension, morbid obesity, stage III kidney disease. Pt feels pain level 0/10. Pt feels that standing at sink, lifting at work makes the pain worse, and stretching, sitting makes the pain better. Pt denies radiating numbness and tingling. Denies recent falls, injuries or trauma. Pt denies new weakness. Pt reports PT has been started. Review of Systems   Constitutional: Negative. Negative for fatigue. HENT: Negative. Negative for trouble swallowing. Eyes: Negative. Respiratory: Negative for cough and shortness of breath. Cardiovascular: Negative for chest pain. Gastrointestinal: Negative. Negative for constipation and diarrhea. Endocrine: Negative. Genitourinary: Negative. Musculoskeletal: Positive for back pain. Skin: Negative. Neurological: Negative for dizziness, weakness and headaches. Hematological: Negative. Psychiatric/Behavioral: Negative. Objective:     Vitals:  /86   Temp 97.3 °F (36.3 °C)   Ht 5' 4\" (1.626 m)   Wt (!) 304 lb (137.9 kg)   BMI 52.18 kg/m² Pain Score:   0 - No pain      Physical Exam  Vitals and nursing note reviewed. This is a pleasant obese female who answers questions appropriately and follows commands. Pt is alert and oriented x 3. Recent and remote memory is intact. Mood and affect, judgement and insight are normal.  No signs of distress, no dyspnea or SOB noted. HEENT: PERRL. Neck is supple, trachea midline. No lymphadenopathy noted. Decreased ROM with flexion and extension of low back. Non-tender with palpation to lumbar spine. Negative SLR. Tightness in both hamstrings noted. Balance and coordination normal.  Strength is functional for ambulation.  Cranial nerves II-XII are intact. Assessment:      Diagnosis Orders   1. Lumbosacral spondylosis without myelopathy         Plan:          No orders of the defined types were placed in this encounter. No orders of the defined types were placed in this encounter. Discussed options with the patient today. Anatomic model pathology was shown and reviewed with pt. Encouraged pt to f/u with PCP for elevated BP - she says she forgot to take her BP meds. She is doing well s/p PT - continue. Will f/u when complete. All questions were answered. Discussed home exercise program.  Relevant imaging and pain generators reviewed. Pt verbalized understanding and agrees with above plan. OARRS was reviewed. This NP saw pt under direct supervision of Dr. Hernandez South. Follow up:  Return for when complete PT.     Pao Garcia, MARK - CNP

## 2022-05-03 ENCOUNTER — HOSPITAL ENCOUNTER (OUTPATIENT)
Dept: PHYSICAL THERAPY | Age: 37
Setting detail: THERAPIES SERIES
Discharge: HOME | End: 2022-05-03
Payer: COMMERCIAL

## 2022-05-03 NOTE — PROGRESS NOTES
Therapy                            Cancellation/No-show Note    Date: 2022  Patient: Petar Rausch (85 y.o. female)  : 1985  MRN:  [id number]  Referring Physician: Mary Silverman DO     Medical Diagnosis: Spondylosis without myelopathy or radiculopathy, lumbosacral region [M47.817]      Visit Information:  Insurance: Payor: [employer] / Plan: Dorthey Bucy / Product Type: *No Product type* /   Visits to Date: 5   No Show/Cancelled Appts: 0 / 1      For today's appointment patient:  [x]  Cancelled  []  Rescheduled appointment  []  No-show   []  Called pt to remind of next appointment     Reason given by patient:  []  Patient ill  []  Conflicting appointment  []  No transportation    []  Conflict with work  [x]  No reason given  []  Other:      [x] Pt has future appointments scheduled, no follow up needed  [] Pt requests to be on hold.     Reason:   If > 2 weeks please discuss with therapist.  [] Therapist to call pt for follow up     Comments:       Signature: Electronically signed by Norma Bustamante PT on 5/3/22 at 8:55 AM EDT

## 2022-05-06 ENCOUNTER — HOSPITAL ENCOUNTER (OUTPATIENT)
Dept: PHYSICAL THERAPY | Age: 37
Setting detail: THERAPIES SERIES
Discharge: HOME | End: 2022-05-06
Payer: COMMERCIAL

## 2022-05-06 PROCEDURE — 97110 THERAPEUTIC EXERCISES: CPT

## 2022-05-06 ASSESSMENT — PAIN SCALES - GENERAL: PAINLEVEL_OUTOF10: 1

## 2022-05-06 ASSESSMENT — PAIN DESCRIPTION - ORIENTATION: ORIENTATION: LOWER

## 2022-05-06 ASSESSMENT — PAIN DESCRIPTION - PAIN TYPE: TYPE: CHRONIC PAIN

## 2022-05-06 ASSESSMENT — PAIN DESCRIPTION - LOCATION: LOCATION: BACK

## 2022-05-06 NOTE — PROGRESS NOTES
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 / Plan: [employer]. / Product Type: *No Product type* /   PT Visit Information  Total # of Visits Approved: 26  Total # of Visits to Date: 6  No Show: 0  Canceled Appointment: 1  Progress Note Counter: -    Subjective Information:  Subjective: Patient reports little pain this date. Patient reports compliance with HEP except for this week secondary to getting tooth pulled.   HEP Compliance:  [x] Good [] Fair [] Poor [] Reports not doing due to:    Pain Screening  Patient Currently in Pain: Yes  Pain Assessment: 0-10  Pain Level: 1 (0.5/10)  Pain Type: Chronic pain  Pain Location: Back  Pain Orientation: Lower    Treatment:  Exercises:  Exercises  Exercise 8: SF L-2.0 x 6 minutes  Exercise 14: sink exercises with minimal UE support, 2 sets x 10 reps each LE each exercise  Exercise 15: standing thoracic & lumbar extension stretch against wall with p-ball, 2 sets x 10 reps with 5 second hold each rep  Exercise 16: mini-lunges on BOSU with unilateral UE support, 1 set x 10 reps, bilateral LEs  Exercise 20: HEP: continue with current + sink exercises & mini-lunges    Modalities:  Moist Heat (CPT 39066)  Patient Position: Seated  Number Minutes Moist Heat: 10  Moist heat location: Left,Right,Low back,Thoracic  Post treatment skin assessment: Intact    *Indicates exercise, modality, or manual techniques to be initiated when appropriate    Objective Measures:     Strength: [x] NT  [] MMT completed:    ROM: [] NT  [x] ROM measurements:  Spine  Lumbar: lumbar extension = >75% with no pain  Spine  Lumbar: lumbar extension = >75% with no pain     Assessment: Body Structures, Functions, Activity Limitations Requiring Skilled Therapeutic Intervention: Decreased functional mobility ,Decreased ADL status,Decreased ROM,Decreased strength,Decreased endurance,Decreased posture,Increased pain  Assessment: Addition of standing bilateral LE exercises this date to focus on TA activation in standing to simulate strengthening in a more functional way as patient performs a lot of daily activities in standing. Cueing for patient to maintain upright posture throughout & not to compensate with hip flexors versus hip abductors. Patient continues to present with little to no pain to therapy sessions & self-reports feeling stronger & improving since starting PT. Concluded with HP to thoracic & lumbar paraspinals for decreased muscle tension following exercise. No pain reported post-treatment session. Treatment Diagnosis: increased low back pain, decreased ability to perform functional mobility tasks & ADLs, impaired functional endurance, decreased postural awareness, decreased pain free lumbar ROM, & decreased bilateral LE & core & lumbar strength  Therapy Prognosis: Good    Post-Pain Assessment:       Pain Rating (0-10 pain scale):   0/10   Location and pain description same as pre-treatment unless indicated.    Action: [] NA   [x] Perform HEP  [] Meds as prescribed  [] Modalities as prescribed   [] Call Physician     GOALS   Patient Goal(s): Patient goals : \"less pain\"    Short Term Goals Completed by 2-4 weeks Goal Status   STG 1 The patient will demonstrate improved postural awareness requiring <25% verbal cueing during functional mobility tasks & exercises In progress     Long Term Goals Completed by 4-6 weeks Goal Status   LTG 1 The patient will improve pain free lumbar AROM >/= 25-50* in order to increase ability to perform functional mobility tasks efficiently. In progress   LTG 2 The patient will demonstrate improved B LE & core & lumbar strength >/=4+/5 in order to perform functional mobility tasks with increased ease. In progress   LTG 3 The patient will report decreased low back pain </=2/10 consistently in order to improve ability to perform functional mobility tasks In progress   LTG 4 The patient will demonstrate competency with HEP to progress towards self management of symptoms upon D/C In progress   LTG 5 The patient will have a decrease in Oswestry score >/=10 points in order to demonstrate increased functional activity tolerance In progress     Plan:  Frequency/Duration:  Plan  Plan weeks: 4-6 weeks  Current Treatment Recommendations: Strengthening,ROM,Balance training,Stair training,Gait training,Positioning,Pain management,Manual Therapy - Joint Manipulation,Manual Therapy - Soft Tissue Mobilization,Safety education & training,Neuromuscular re-education,Home exercise program,Patient/Caregiver education & training  Pt to continue current HEP. See objective section for any therapeutic exercise changes, additions or modifications this date.     Therapy Time:   PT Individual Minutes  Time In: 1332  Time Out: 1420  Minutes: 48  Timed Code Treatment Minutes: 38 Minutes  Procedure Minutes: 10 minutes HP  Timed Activity Minutes Units   Ther Ex 38 3     Electronically signed by Jessica Fenton PT on 5/6/22 at 2:21 PM EDT

## 2022-05-10 ENCOUNTER — HOSPITAL ENCOUNTER (OUTPATIENT)
Dept: PHYSICAL THERAPY | Age: 37
Setting detail: THERAPIES SERIES
Discharge: HOME | End: 2022-05-10
Payer: COMMERCIAL

## 2022-05-10 PROCEDURE — 97110 THERAPEUTIC EXERCISES: CPT

## 2022-05-10 ASSESSMENT — PAIN DESCRIPTION - PAIN TYPE: TYPE: CHRONIC PAIN

## 2022-05-10 ASSESSMENT — PAIN DESCRIPTION - ORIENTATION: ORIENTATION: LOWER

## 2022-05-10 ASSESSMENT — PAIN SCALES - GENERAL: PAINLEVEL_OUTOF10: 1

## 2022-05-10 ASSESSMENT — PAIN DESCRIPTION - DESCRIPTORS: DESCRIPTORS: ACHING

## 2022-05-10 ASSESSMENT — PAIN DESCRIPTION - LOCATION: LOCATION: BACK

## 2022-05-10 NOTE — PROGRESS NOTES
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]    Treatment Diagnosis: increased low back pain, decreased ability to perform functional mobility tasks & ADLs, impaired functional endurance, decreased postural awareness, decreased pain free lumbar ROM, & decreased bilateral LE & core & lumbar strength    Visit Information:  Insurance: Payor: [employer] / Plan: [employer] / Product Type: *No Product type* /   PT Visit Information  Total # of Visits Approved: 26  Total # of Visits to Date: 7  No Show: 0  Canceled Appointment: 1  Progress Note Counter:     Subjective Information:  Subjective: pain in my back is minimal, my right knee bothers me alittle more today, I just got off work  HEP Compliance:  [x] Good [] Fair [] Poor [] Reports not doing due to:    Pain Screening  Patient Currently in Pain: Yes  Pain Level: 1  Pain Type: Chronic pain  Pain Location: Back  Pain Orientation: Lower  Pain Descriptors: Aching    Treatment:  Exercises:  Exercises  Exercise 2: bridges 5 sec x 15  Exercise 3: LTR's 10 sec x 10  Exercise 4: open book 5 sec x 15  Exercise 5: SLR with TA x 15, b/l  Exercise 6: S/L Abd B x 10  Exercise 7: thoracic stretch 3-way 10 sec x 10  Exercise 8: Nu-Step L-3, 5 min  Exercise 9: DLS 3-way x 15 on p-ball  Exercise 10: reverse crunches x 20 w/ ball between knees  Exercise 15: LX ext touching shoulder blades only x 20     *Indicates exercise, modality, or manual techniques to be initiated when appropriate    Objective Measures:      Strength: [] NT  [x] MMT completed:  Strength RLE  Comment: SLR 4-/5, S/L anbd 4/5  Strength LLE  Comment: SLR 4/5, S/L abd 4+/5         ROM: [x] NT  [] ROM measurements: Assessment: Body Structures, Functions, Activity Limitations Requiring Skilled Therapeutic Intervention: Decreased functional mobility ,Decreased ADL status,Decreased ROM,Decreased strength,Decreased endurance,Decreased posture,Increased pain  Assessment: continued w/ current exercises, increased reps w/ SLR, reverse crunches and open book, performed DLS 3-way seated on p-ball, no support used, good tolerance to session, declined modalities  Treatment Diagnosis: increased low back pain, decreased ability to perform functional mobility tasks & ADLs, impaired functional endurance, decreased postural awareness, decreased pain free lumbar ROM, & decreased bilateral LE & core & lumbar strength  Therapy Prognosis: Good     Post-Pain Assessment:       Pain Rating (0-10 pain scale):   0/10   Location and pain description same as pre-treatment unless indicated. Action: [] NA   [x] Perform HEP  [] Meds as prescribed  [] Modalities as prescribed   [] Call Physician     GOALS   Patient Goal(s): Patient goals : \"less pain\"    Short Term Goals Completed by 2-4 weeks Goal Status   STG 1 The patient will demonstrate improved postural awareness requiring <25% verbal cueing during functional mobility tasks & exercises In progress       Long Term Goals Completed by 4-6 weeks Goal Status   LTG 1 The patient will improve pain free lumbar AROM >/= 25-50* in order to increase ability to perform functional mobility tasks efficiently. In progress   LTG 2 The patient will demonstrate improved B LE & core & lumbar strength >/=4+/5 in order to perform functional mobility tasks with increased ease.  In progress   LTG 3 The patient will report decreased low back pain </=2/10 consistently in order to improve ability to perform functional mobility tasks In progress   LTG 4 The patient will demonstrate competency with HEP to progress towards self management of symptoms upon D/C In progress   LTG 5 The patient will have a decrease in Oswestry score >/=10 points in order to demonstrate increased functional activity tolerance In progress            Plan:  Frequency/Duration:  Plan  Plan weeks: 4-6 weeks  Current Treatment Recommendations: Strengthening,ROM,Balance training,Stair training,Gait training,Positioning,Pain management,Manual Therapy - Joint Manipulation,Manual Therapy - Soft Tissue Mobilization,Safety education & training,Neuromuscular re-education,Home exercise program,Patient/Caregiver education & training  Plan Comment: D/C on NV  Pt to continue current HEP. See objective section for any therapeutic exercise changes, additions or modifications this date.     Therapy Time:      PT Individual Minutes  Time In: 9615  Time Out: 3440  Minutes: 38  Timed Code Treatment Minutes: 38 Minutes  Procedure Minutes:0  Timed Activity Minutes Units   Ther Ex 38 3     Electronically signed by Albino Koenig PTA on 5/10/22 at 2:12 PM EDT

## 2022-05-12 ENCOUNTER — HOSPITAL ENCOUNTER (OUTPATIENT)
Dept: PHYSICAL THERAPY | Age: 37
Setting detail: THERAPIES SERIES
Discharge: HOME | End: 2022-05-12
Payer: COMMERCIAL

## 2022-05-12 PROCEDURE — 97110 THERAPEUTIC EXERCISES: CPT

## 2022-05-12 ASSESSMENT — PAIN DESCRIPTION - DESCRIPTORS: DESCRIPTORS: ACHING;SORE

## 2022-05-12 ASSESSMENT — PAIN SCALES - GENERAL: PAINLEVEL_OUTOF10: 3

## 2022-05-12 ASSESSMENT — PAIN DESCRIPTION - ORIENTATION: ORIENTATION: LOWER;MID

## 2022-05-12 ASSESSMENT — PAIN DESCRIPTION - PAIN TYPE: TYPE: CHRONIC PAIN

## 2022-05-12 ASSESSMENT — PAIN DESCRIPTION - LOCATION: LOCATION: BACK

## 2022-05-12 NOTE — PROGRESS NOTES
[address], [address]     Ph: [phone]  Fax: [phone]      [] Certification  [] Recertification []  Plan of Care  [] Progress Note [x] Discharge      Referring Provider: Renetta Messer DO   From:   Mishel Gilbert, PT, DPT  Patient: Kelsi Rodriguez (40 y.o. 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]    Treatment Diagnosis: increased low back pain, decreased ability to perform functional mobility tasks & ADLs, impaired functional endurance, decreased postural awareness, decreased pain free lumbar ROM, & decreased bilateral LE & core & lumbar strength    Plan of Care/Certification Expiration Date: :     Progress Report Period from:  2022  to 2022    Visits to Date: 8 No Show: 0 Cancelled Appts: 1    OBJECTIVE:   Short Term Goals - Time Frame for Short term goals: 2-4 weeks    Goals Current/Discharge status  Status   Short term goal 1: The patient will demonstrate improved postural awareness requiring <25% verbal cueing during functional mobility tasks & exercises  Improved postural awareness Met     Long Term Goals - Time Frame for Long term goals : 4-6 weeks  Goals Current/ Discharge status Status   Long term goal 1: The patient will improve pain free lumbar AROM >/= 25-50* in order to increase ability to perform functional mobility tasks efficiently. Spine  Lumbar: Lx flexion >90%, SB 75% b/l, rotation WNL, b/l    Met   Long term goal 2: The patient will demonstrate improved B LE & core & lumbar strength >/=4+/5 in order to perform functional mobility tasks with increased ease.  Strength LLE  Comment: SLR 4/5, S/L abd 4+/5, knee flexion 4+/5, ext 5/5  Strength RLE  Comment: SLR 4-/5, S/L abd 4/5, knee ext 5/5, flexion 5/5      Other: core 4/5, lumbar ext 4-/5 Partially met   Long term goal 3: The patient will report decreased low back pain </=2/10 consistently in order to improve ability to perform functional mobility tasks Self-reports average level of pain </=2/10 Met   Long term goal 4: The patient will demonstrate competency with HEP to progress towards self management of symptoms upon D/C Independent/compliant Met   Long term goal 5: The patient will have a decrease in Oswestry score >/=10 points in order to demonstrate increased functional activity tolerance 2/50 = Oswestry    +10 point improvement Met     Assessment: Patient is a 40year old female who presented on 4/14/22 with chronic low back pain that began years prior & recently had flared-up ~1 week prior to beginning outpatient PT. Patient has made significant progress towards all of her goals established upon initial evaluation, meeting all goals completely except for strength goal which patient still made significant progress towards. Patient in agreement with discharge from outpatient PT services this date with recommendation for patient to continue with PT HEP regularly as well as to follow-up with MD as appropriate. PLAN:      Patient Status:[] Continue/ Initiate plan of Care    [x] Discharge PT. Recommend pt continue with HEP. [] Additional visits requested, Please re-certify for additional visits:    [] Hold         Signature: objective info byElectronically signed by Kimberly Gonzalez PTA on 5/12/22 at 2:30 PM EDT    Electronically signed by Marcial Hennessy PT on 5/13/2022 at 9:42 AM    If you have any questions or concerns, please don't hesitate to call. Thank you for your referral.    I have reviewed this plan of care and certify a need for medically necessary rehabilitation services.     Physician Signature:__________________________________________________________  Date:  Please sign and return

## 2022-05-12 NOTE — PROGRESS NOTES
[address]     Ph: [phone]  Fax: [phone]      [] Certification  [] Recertification []  Plan of Care  [] Progress Note [x] Discharge      Referring Provider: Renetta Messer DO      From:     Patient: Kelsi Rodriguez (51 y.o. 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]    Treatment Diagnosis: increased low back pain, decreased ability to perform functional mobility tasks & ADLs, impaired functional endurance, decreased postural awareness, decreased pain free lumbar ROM, & decreased bilateral LE & core & lumbar strength    Plan of Care/Certification Expiration Date: :     Progress Report Period from:  2022  to 2022    Visits to Date: 8 No Show: 0 Cancelled Appts: 1    OBJECTIVE:   Short Term Goals - Time Frame for Short term goals: 2-4 weeks    Goals Current/Discharge status  Status   Short term goal 1: The patient will demonstrate improved postural awareness requiring <25% verbal cueing during functional mobility tasks & exercises   In progress     Long Term Goals - Time Frame for Long term goals : 4-6 weeks  Goals Current/ Discharge status Status   Long term goal 1: The patient will improve pain free lumbar AROM >/= 25-50* in order to increase ability to perform functional mobility tasks efficiently. In progress   Long term goal 2: The patient will demonstrate improved B LE & core & lumbar strength >/=4+/5 in order to perform functional mobility tasks with increased ease.   In progress   Long term goal 3: The patient will report decreased low back pain </=2/10 consistently in order to improve ability to perform functional mobility tasks  In progress   Long term goal 4: The patient will demonstrate competency with HEP to progress towards self management of symptoms upon D/C  In progress   Long term goal 5: The patient will have a decrease in Oswestry score >/=10 points in order to demonstrate increased functional activity tolerance  In progress       Body Structures, Functions, Activity Limitations Requiring Skilled Therapeutic Intervention: Decreased functional mobility ,Decreased ADL status,Decreased ROM,Decreased strength,Decreased endurance,Decreased posture,Increased pain  Assessment: continued w/ current exercises to improve Lx mobility and LE strength, improved tolerance to all, patient declined modalities, FERNANDA=2/50  Therapy Prognosis: Good    PLAN:   Frequency/Duration:  Plan weeks: 4-6 weeks  Current Treatment Recommendations: Strengthening,ROM,Balance training,Stair training,Gait training,Positioning,Pain management,Manual Therapy - Joint Manipulation,Manual Therapy - Soft Tissue Mobilization,Safety education & training,Neuromuscular re-education,Home exercise program,Patient/Caregiver education & training      Patient Status:[] Continue/ Initiate plan of Care    [x] Discharge PT. Recommend pt continue with HEP. [] Additional visits requested, Please re-certify for additional visits:    [] Hold         Signature: objective info byElectronically signed by Zafar Centeno PTA on 5/12/22 at 2:34 PM EDT      If you have any questions or concerns, please don't hesitate to call. Thank you for your referral.    I have reviewed this plan of care and certify a need for medically necessary rehabilitation services.     Physician Signature:__________________________________________________________  Date:  Please sign and return

## 2022-05-12 NOTE — PROGRESS NOTES
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]    Treatment Diagnosis: increased low back pain, decreased ability to perform functional mobility tasks & ADLs, impaired functional endurance, decreased postural awareness, decreased pain free lumbar ROM, & decreased bilateral LE & core & lumbar strength    Visit Information:  Insurance: Payor: [employer] / Plan: [employer] [place] / Product Type: *No Product type* /   PT Visit Information  Total # of Visits Approved: 26  Total # of Visits to Date: 8  No Show: 0  Canceled Appointment: 1  Progress Note Counter: -    Subjective Information:  Subjective: pain today is 3/10, I just got off work  HEP Compliance:  [x] Good [] Fair [] Poor [] Reports not doing due to:    Pain Screening  Patient Currently in Pain: Yes  Pain Level: 3  Pain Type: Chronic pain  Pain Location: Back  Pain Orientation: Lower,Mid  Pain Descriptors: Aching,Sore    Treatment:  Exercises:  Exercises  Exercise 3: LTR's 10 sec x 10  Exercise 4: open book 5 sec x 15  Exercise 7: thoracic stretch 3-way 10 sec x 10  Exercise 8: Sci-Fit, L-3.0, 5 min  Exercise 15: LX ext touching shoulder blades only x 20  Exercise 20: obj measures 6 min       *Indicates exercise, modality, or manual techniques to be initiated when appropriate    Objective Measures:                                 Strength: [] NT  [] MMT completed:  Strength RLE  Comment: SLR 4-/5, S/L abd 4/5, knee ext 5/5, flexion 5/5  Strength LLE  Comment: SLR 4/5, S/L abd 4+/5, knee flexion 4+/5, ext 5/5        Strength Other  Other: core 4/5, lumbar ext 4-/5    ROM: [] NT  [] ROM measurements:          Spine  Lumbar: Lx flexion >90%, SB 75% b/l, rotation WNL, b/l  Spine  Lumbar: Lx flexion >90%, SB 75% b/l, rotation WNL, b/l     Assessment: Body Structures, Functions, Activity Limitations Requiring Skilled Therapeutic Intervention: Decreased functional mobility ,Decreased ADL status,Decreased ROM,Decreased strength,Decreased endurance,Decreased posture,Increased pain  Assessment: continued w/ current exercises to improve Lx mobility and LE strength, improved tolerance to all, patient declined modalities, FERNANDA=2/50  Treatment Diagnosis: increased low back pain, decreased ability to perform functional mobility tasks & ADLs, impaired functional endurance, decreased postural awareness, decreased pain free lumbar ROM, & decreased bilateral LE & core & lumbar strength  Therapy Prognosis: Good          Post-Pain Assessment:       Pain Rating (0-10 pain scale):   \"better\"/10   Location and pain description same as pre-treatment unless indicated. Action: [] NA   [x] Perform HEP  [] Meds as prescribed  [] Modalities as prescribed   [] Call Physician     GOALS   Patient Goal(s): Patient goals : \"less pain\"    Short Term Goals Completed by 2-4 weeks Goal Status   STG 1 The patient will demonstrate improved postural awareness requiring <25% verbal cueing during functional mobility tasks & exercises In progress       Long Term Goals Completed by 4-6 weeks Goal Status   LTG 1 The patient will improve pain free lumbar AROM >/= 25-50* in order to increase ability to perform functional mobility tasks efficiently. In progress   LTG 2 The patient will demonstrate improved B LE & core & lumbar strength >/=4+/5 in order to perform functional mobility tasks with increased ease.  In progress   LTG 3 The patient will report decreased low back pain </=2/10 consistently in order to improve ability to perform functional mobility tasks In progress   LTG 4 The patient will demonstrate competency with HEP to progress towards self management of symptoms upon D/C In progress   LTG 5 The patient will have a decrease in Oswestry score >/=10 points in order to demonstrate increased functional activity tolerance In progress            Plan:  Frequency/Duration:  Plan  Plan weeks: 4-6 weeks  Current Treatment Recommendations: Strengthening,ROM,Balance training,Stair training,Gait training,Positioning,Pain management,Manual Therapy - Joint Manipulation,Manual Therapy - Soft Tissue Mobilization,Safety education & training,Neuromuscular re-education,Home exercise program,Patient/Caregiver education & training  Pt to continue current HEP. See objective section for any therapeutic exercise changes, additions or modifications this date.     Therapy Time:      PT Individual Minutes  Time In: 6347  Time Out: 9738  Minutes: 38  Timed Code Treatment Minutes: 38 Minutes  Procedure Minutes:0  Timed Activity Minutes Units   Ther ex 38 3     Electronically signed by Audrey Gonzales PTA on 5/12/22 at 2:15 PM EDT

## 2024-06-27 ENCOUNTER — HOSPITAL ENCOUNTER (OUTPATIENT)
Dept: RADIOLOGY | Facility: CLINIC | Age: 39
Discharge: HOME | End: 2024-06-27
Payer: COMMERCIAL

## 2024-06-27 ENCOUNTER — OFFICE VISIT (OUTPATIENT)
Dept: ORTHOPEDIC SURGERY | Facility: CLINIC | Age: 39
End: 2024-06-27
Payer: COMMERCIAL

## 2024-06-27 VITALS — BODY MASS INDEX: 50.02 KG/M2 | HEIGHT: 64 IN | WEIGHT: 293 LBS

## 2024-06-27 DIAGNOSIS — M79.631 RIGHT FOREARM PAIN: ICD-10-CM

## 2024-06-27 DIAGNOSIS — M79.641 RIGHT HAND PAIN: ICD-10-CM

## 2024-06-27 DIAGNOSIS — M65.4 DE QUERVAIN'S TENOSYNOVITIS, RIGHT: Primary | ICD-10-CM

## 2024-06-27 DIAGNOSIS — M77.01 GOLFER'S ELBOW, RIGHT: ICD-10-CM

## 2024-06-27 PROCEDURE — 99214 OFFICE O/P EST MOD 30 MIN: CPT | Performed by: STUDENT IN AN ORGANIZED HEALTH CARE EDUCATION/TRAINING PROGRAM

## 2024-06-27 PROCEDURE — L3809 WHFO W/O JOINTS PRE OTS: HCPCS | Performed by: STUDENT IN AN ORGANIZED HEALTH CARE EDUCATION/TRAINING PROGRAM

## 2024-06-27 PROCEDURE — 73130 X-RAY EXAM OF HAND: CPT | Mod: RIGHT SIDE | Performed by: STUDENT IN AN ORGANIZED HEALTH CARE EDUCATION/TRAINING PROGRAM

## 2024-06-27 PROCEDURE — 73130 X-RAY EXAM OF HAND: CPT | Mod: RT

## 2024-06-27 PROCEDURE — 99213 OFFICE O/P EST LOW 20 MIN: CPT | Performed by: STUDENT IN AN ORGANIZED HEALTH CARE EDUCATION/TRAINING PROGRAM

## 2024-06-27 PROCEDURE — 73090 X-RAY EXAM OF FOREARM: CPT | Mod: RT

## 2024-06-27 PROCEDURE — 73090 X-RAY EXAM OF FOREARM: CPT | Mod: RIGHT SIDE | Performed by: STUDENT IN AN ORGANIZED HEALTH CARE EDUCATION/TRAINING PROGRAM

## 2024-06-27 PROCEDURE — 1036F TOBACCO NON-USER: CPT | Performed by: STUDENT IN AN ORGANIZED HEALTH CARE EDUCATION/TRAINING PROGRAM

## 2024-06-27 RX ORDER — ERGOCALCIFEROL 1.25 MG/1
1 CAPSULE ORAL
COMMUNITY

## 2024-06-27 RX ORDER — ROSUVASTATIN CALCIUM 10 MG/1
1 TABLET, COATED ORAL NIGHTLY
COMMUNITY
Start: 2022-11-15

## 2024-06-27 RX ORDER — HYDROCHLOROTHIAZIDE 12.5 MG/1
12.5 CAPSULE ORAL
COMMUNITY
Start: 2023-10-05

## 2024-06-27 RX ORDER — METFORMIN HYDROCHLORIDE 500 MG/1
500 TABLET ORAL
COMMUNITY

## 2024-06-27 RX ORDER — DICLOFENAC SODIUM 10 MG/G
4 GEL TOPICAL 3 TIMES DAILY PRN
Qty: 100 G | Refills: 1 | Status: SHIPPED | OUTPATIENT
Start: 2024-06-27

## 2024-06-27 ASSESSMENT — PATIENT HEALTH QUESTIONNAIRE - PHQ9
1. LITTLE INTEREST OR PLEASURE IN DOING THINGS: NOT AT ALL
SUM OF ALL RESPONSES TO PHQ9 QUESTIONS 1 AND 2: 0
2. FEELING DOWN, DEPRESSED OR HOPELESS: NOT AT ALL

## 2024-06-27 NOTE — PROGRESS NOTES
Acute Injury Established Patient Visit    HPI: Pat is a 39 y.o.female who presents today with new complaints of right hand and arm pain.  She is right-hand dominant.  States it has been going on for 4 to 5 days.  She has some numbness into her first and second fingers.  She had a prior carpal tunnel surgery years ago.  She works at the [employer] at [employer].  She denies any swelling or bruising.  She denies any falls and injuries.  He has been trying Tylenol and ice.    Plan: For this right de Quervain's tenosynovitis and right medial epicondylitis, we will start diclofenac gel as she cannot tolerate oral NSAIDs due to kidney issues, we will have her in a thumb spica brace,  a forearm strap, start occupational therapy, continue ice and Tylenol, and follow-up in 5 to 6 weeks.    Assessment:   Problem List Items Addressed This Visit    None  Visit Diagnoses       De Quervain's tenosynovitis, right    -  Primary    Relevant Medications    diclofenac sodium (Voltaren) 1 % gel    Other Relevant Orders    Wrist and Thumb Stabilizer    Referral to Occupational Therapy    Right hand pain        Relevant Medications    diclofenac sodium (Voltaren) 1 % gel    Other Relevant Orders    XR hand right 3+ views    Referral to Occupational Therapy    Right forearm pain        Relevant Medications    diclofenac sodium (Voltaren) 1 % gel    Other Relevant Orders    XR forearm right 2 views    Referral to Occupational Therapy    Golfer's elbow, right        Relevant Medications    diclofenac sodium (Voltaren) 1 % gel    Other Relevant Orders    Wrist and Thumb Stabilizer    Referral to Occupational Therapy            Diagnostics: X-rays reviewed and show no acute fracture or dislocation of the right hand or forearm.      Procedure:  Procedures    Physical Exam:  GENERAL:  No obvious acute distress.  NEURO:  Distally neurovascularly intact.  Sensation intact to light touch.  Extremity: Right wrist:  Skin healthy and  intact  No gross swelling or ecchymosis  TENDER over the common flexor tendon at the medial epicondyles of the elbow  Painful with resisted flexion at the wrist  Volar flexion, dorsiflexion, pronation/supination without limitation  No tenderness to palpation over distal radius  No tenderness to palpation over distal ulna or TFCC  No tenderness to palpation over the scaphoid  Negative piano key sign  POSITIVE Finkelstein test  Neurovascular exam normal distally      Orders Placed This Encounter    Wrist and Thumb Stabilizer    XR hand right 3+ views    XR forearm right 2 views    Referral to Occupational Therapy    diclofenac sodium (Voltaren) 1 % gel      At the conclusion of the visit there were no further questions by the patient/family regarding their plan of care.  Patient was instructed to call or return with any issues, questions, or concerns regarding their injury and/or treatment plan described above.     06/27/24 at 12:14 PM - Tristin Pike DO    Office: [phone]    This note was prepared using voice recognition software.  The details of this note are correct and have been reviewed, and corrected to the best of my ability.  Some grammatical errors may persist related to the Dragon software.

## 2024-08-06 ENCOUNTER — APPOINTMENT (OUTPATIENT)
Dept: ORTHOPEDIC SURGERY | Facility: CLINIC | Age: 39
End: 2024-08-06
Payer: COMMERCIAL